# Patient Record
Sex: MALE | Race: BLACK OR AFRICAN AMERICAN | HISPANIC OR LATINO | URBAN - METROPOLITAN AREA
[De-identification: names, ages, dates, MRNs, and addresses within clinical notes are randomized per-mention and may not be internally consistent; named-entity substitution may affect disease eponyms.]

---

## 2023-08-18 ENCOUNTER — HOSPITAL ENCOUNTER (OUTPATIENT)
Dept: RADIOLOGY | Facility: HOSPITAL | Age: 28
Discharge: HOME | End: 2023-08-18
Attending: FAMILY MEDICINE
Payer: MEDICAID

## 2023-08-18 DIAGNOSIS — S83.242A OTHER TEAR OF MEDIAL MENISCUS, CURRENT INJURY, LEFT KNEE, INITIAL ENCOUNTER: ICD-10-CM

## 2023-08-18 PROCEDURE — 73721 MRI JNT OF LWR EXTRE W/O DYE: CPT | Mod: TC,LT

## 2023-08-18 PROCEDURE — 73721 MRI JNT OF LWR EXTRE W/O DYE: CPT | Mod: 26,LT,, | Performed by: RADIOLOGY

## 2023-08-18 PROCEDURE — 73721 MRI KNEE WITHOUT CONTRAST LEFT: ICD-10-PCS | Mod: 26,LT,, | Performed by: RADIOLOGY

## 2023-11-01 NOTE — PROGRESS NOTES
[facility]   Physical Therapy Initial Evaluation     Date: 11/2/2023   Name: Nino Sanchez [place]  Clinic Number: [id number]    Therapy Diagnosis:   Encounter Diagnoses   Name Primary?    Acute medial meniscus tear of left knee, initial encounter     Left knee pain, unspecified chronicity      Physician: Jean Marie Brizuela MD    Physician Orders: PT Eval and Treat   Medical Diagnosis from Referral: S83.242A (ICD-10-CM) - Acute medial meniscus tear of left knee, initial encounter  Evaluation Date: 11/2/2023  Authorization Period Expiration: 9/19/2024  Plan of Care Expiration: 1/20/2024  Progress Note Due: Visit 4  Visit #:  1/6   Visits authorized: 1/ 1   FOTO: 1/3    Precautions: Standard     Time In: 1100   Time Out: 1145  Total Appointment Time (timed & untimed codes): 45 minutes      SUBJECTIVE     Date of onset: 6 months ago    History of current condition - Nino reports: torn L mensicus. He thinks it may have happened while he was at work. He is trying to avoid having surgery. He feels that symptoms have improved over time, but still has problems with weightbearing flexion movements. Experiences popping/clicking at times when navigating stairs.    Falls: none    Imaging, MRI studies:   EXAMINATION:  MRI KNEE WITHOUT CONTRAST LEFT     CLINICAL HISTORY:  Other tear of medial meniscus, current injury, left knee, initial encounter     TECHNIQUE:  Multiplanar, multisequence images were performed about the left knee.  No contrast was administered.     COMPARISON:  None     FINDINGS:  There is a complex tear of the posterior horn and body segment of the medial meniscus, with primarily vertical longitudinal morphology.  The lateral meniscus is intact.     The ACL, PCL, MCL, and LCL structures are intact.  The extensor mechanism is intact.  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     There is no osseous fracture or contusion.   Articular cartilage is well maintained.     There is a moderate joint effusion.     Impression:     Complex tear of the medial meniscus.     Moderate joint effusion.    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    Surgical History:   Nino De Dios  has no past surgical history on file.    Medications:   Nino currently has no medications in their medication list.    Allergies:   Review of patient's allergies indicates:  No Known Allergies       OBJECTIVE       Range of Motion:   Knee Left active Left Passive Right Active R passive   Flexion 140  140    Extension 0  0            Lower Extremity Strength  Right LE  Left LE    Knee extension: 5/5 Knee extension: 4+/5   Knee flexion: 5/5 Knee flexion: 5/5   Hip flexion: 5/5 Hip flexion: 5/5   Hip extension:  5/5 Hip extension: 5/5   Hip abduction: 5/5 Hip abduction: 5/5   Hip adduction:  Hip adduction    Ankle dorsiflexion:  Ankle dorsiflexion:    Ankle plantarflexion:  Ankle plantarflexion:            Special Tests:   Left Right   Valgus Stress Test +    Varus Stress test -    Lachman's test     Posterior Lachman     Jorgito's Test +    Apley's Compression     Apley's Distraction     Mahajan's compression test     Thessaly's Test +    Patellar Grind Test + +     Step down test: Slight increase in genu valgus on L      Function:    - Squat: can squat through  full ROM, pain at end range and when returning to upright         Joint Mobility: Hypermobile  Patellar bilateral    Palpation: unremarkable    Sensation: (B) LEs intact to light touch           Limitation/Restriction for FOTO Intake Survey    Therapist reviewed FOTO scores for Nino De Dios on 2023.   FOTO documents entered into [employer] - see Media section.    Functional Score: 59%         TREATMENT     Total Treatment time (time-based codes) separate from Evaluation: 10 minutes      Nino received the treatments listed below:      therapeutic exercises to develop strength, endurance, ROM, posture, and core stabilization for 0 minutes including:      manual therapy techniques: Joint mobilizations, Myofacial release, and Soft tissue Mobilization were applied to the:  for 0 minutes, includin    neuromuscular re-education activities to improve: Balance, Coordination, Proprioception, and Posture for 0 minutes. The following activities were included:      therapeutic activities to improve functional performance for 10  minutes, including:  - Pt education on POC, HEP, open chain->closed chain strengthening to allow healing of meniscus      PATIENT EDUCATION AND HOME EXERCISES     Education provided:   - Objective exam, POC, HEP, do's and do not's for torn meniscus    Written Home Exercises Provided: yes. Exercises were reviewed and Nion was able to demonstrate them prior to the end of the session.  Nino demonstrated good  understanding of the education provided. See EMR under Patient Instructions for exercises provided during therapy sessions.    ASSESSMENT     Nino is a 28 y.o. male referred to outpatient Physical Therapy with a medical diagnosis of S83.242A (ICD-10-CM) - Acute medial meniscus tear of left knee, initial encounter. Patient presents with the following deficits: positive pain indications, and difficulty performing functional tasks. Deficits are minimal at this time. Pt is able to  perform all daily activities that he is required to at this time. However, some activities are still painful for him. Upon discussion of POC, pt requests to attempt self management with HEP only at this time. HEP was provided to improve L hip and knee strength & ROM. Pt was educated on progression of open chain -> closed chain knee exercises to avoid aggravation of meniscus tear. Advised pt that POC will still be made in case anything changes in the coming weeks and he feels he needs to come back to PT. No adverse effects    Patient prognosis is Excellent.   Patient will benefit from skilled outpatient Physical Therapy to address the deficits stated above and in the chart below, provide patient /family education, and to maximize patientt's level of independence.     Plan of care discussed with patient: Yes  Patient's spiritual, cultural and educational needs considered and patient is agreeable to the plan of care and goals as stated below:     Anticipated Barriers for therapy: none    Medical Necessity is demonstrated by the following  History  Co-morbidities and personal factors that may impact the plan of care Co-morbidities:   none    Personal Factors:   no deficits     low   Examination  Body Structures and Functions, activity limitations and participation restrictions that may impact the plan of care Body Regions:   lower extremities    Body Systems:    ROM  strength       low   Clinical Presentation stable and uncomplicated low   Decision Making/ Complexity Score: low     Goals:  Short Term Goals: 2 weeks   Independent with HEP to self manage symptoms and return to PLOF    Long Term Goals: 6 weeks   Able to squat through full ROM without symptom reproduction to perform job related tasks without difficulty  Able to navigate steps without pain to enter/exit his apartment  Improve FOTO score to 73% or better to indicate improved functional ability    PLAN   Plan of care Certification: 11/2/2023 to  1/20/2024.    Outpatient Physical Therapy 1 times weekly for 6 weeks to include the following interventions: Gait Training, Manual Therapy, Neuromuscular Re-ed, Patient Education, Therapeutic Activities, and Therapeutic Exercise.     Guido Reeves, PT      I CERTIFY THE NEED FOR THESE SERVICES FURNISHED UNDER THIS PLAN OF TREATMENT AND WHILE UNDER MY CARE   Physician's comments:     Physician's Signature: ___________________________________________________

## 2023-11-02 ENCOUNTER — CLINICAL SUPPORT (OUTPATIENT)
Dept: REHABILITATION | Facility: HOSPITAL | Age: 28
End: 2023-11-02
Attending: STUDENT IN AN ORGANIZED HEALTH CARE EDUCATION/TRAINING PROGRAM
Payer: MEDICAID

## 2023-11-02 DIAGNOSIS — S83.242A ACUTE MEDIAL MENISCUS TEAR OF LEFT KNEE, INITIAL ENCOUNTER: ICD-10-CM

## 2023-11-02 DIAGNOSIS — M25.562 LEFT KNEE PAIN, UNSPECIFIED CHRONICITY: ICD-10-CM

## 2023-11-02 PROCEDURE — 97530 THERAPEUTIC ACTIVITIES: CPT | Mod: PN

## 2023-11-02 PROCEDURE — 97161 PT EVAL LOW COMPLEX 20 MIN: CPT | Mod: PN

## 2023-11-02 NOTE — PATIENT INSTRUCTIONS
[employer] HOME EXERCISE PROGRAM  Created by Guido Reeves  Nov 2nd, 2023  View videos at [email or web address]   5 Exercises       Hamstring Stretch    Sitting up tall in chair, prop leg up on stool with leg straight and toes up toward ceiling. Hinge forward at waist keeping chest up tall until stretch is felt in back of leg (hamstring).   Repeat 3 Times  Hold 30 Seconds       STANDING CALF STRETCH - GASTROCNEMIUS    Start by standing in front of a wall or other sturdy object. Step forward with one foot and maintain your toes on both feet to be pointed straight forward. Keep the leg behind you with a straight knee during the stretch.    Lean forward towards the wall and support yourself with your arms as you allow your front knee to bend until a gentle stretch is felt along the back of your leg that is most behind you.    Move closer or further away from the wall to control the stretch of the back leg. Also you can adjust the bend of the front knee to control the stretch as well.    Video # [id number]   Repeat 3 Times  Hold 30 Seconds       Eccentric Long Arc Quad    In a seated position on a stable high surface, use the uninvolved leg to push the involved Knee up to an extended position. Slowly bring the leg down to flexed position.   Repeat 15 Times  Complete 3 Sets       STANDING HAMSTRING CURLS    While standing, bend your knee so that your heel moves towards your buttock. Lower back down until first contact with floor and repeat.  Keep knees in-line with one another.    Video # [id number]   Repeat 15 Times  Complete 3 Sets       LATERAL MONSTER WALK - ELASTIC BAND AT ANKLES SIDE STEPS    Place a looped elastic band around both ankles.    Next, bend your knees and step to the side while keeping tension on the band the entire time. After taking sidesteps to the side in one direction, reverse the direction taking sidesteps until you return to the starting position. Repeat.    Video # [id number]   Hold 1  Minute  Complete 3 Sets

## 2023-11-02 NOTE — PLAN OF CARE
[facility]   Physical Therapy Initial Evaluation     Date: 11/2/2023   Name: Nino Sanchez [place]  Clinic Number: [id number]    Therapy Diagnosis:   Encounter Diagnoses   Name Primary?    Acute medial meniscus tear of left knee, initial encounter     Left knee pain, unspecified chronicity      Physician: Jean Marie Brizuela MD    Physician Orders: PT Eval and Treat   Medical Diagnosis from Referral: S83.242A (ICD-10-CM) - Acute medial meniscus tear of left knee, initial encounter  Evaluation Date: 11/2/2023  Authorization Period Expiration: 9/19/2024  Plan of Care Expiration: 1/20/2024  Progress Note Due: Visit 4  Visit #:  1/6   Visits authorized: 1/ 1   FOTO: 1/3    Precautions: Standard     Time In: 1100   Time Out: 1145  Total Appointment Time (timed & untimed codes): 45 minutes      SUBJECTIVE     Date of onset: 6 months ago    History of current condition - Nino reports: torn L mensicus. He thinks it may have happened while he was at work. He is trying to avoid having surgery. He feels that symptoms have improved over time, but still has problems with weightbearing flexion movements. Experiences popping/clicking at times when navigating stairs.    Falls: none    Imaging, MRI studies:   EXAMINATION:  MRI KNEE WITHOUT CONTRAST LEFT     CLINICAL HISTORY:  Other tear of medial meniscus, current injury, left knee, initial encounter     TECHNIQUE:  Multiplanar, multisequence images were performed about the left knee.  No contrast was administered.     COMPARISON:  None     FINDINGS:  There is a complex tear of the posterior horn and body segment of the medial meniscus, with primarily vertical longitudinal morphology.  The lateral meniscus is intact.     The ACL, PCL, MCL, and LCL structures are intact.  The extensor mechanism is intact.  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     There is no osseous fracture or contusion.   Articular cartilage is well maintained.     There is a moderate joint effusion.     Impression:     Complex tear of the medial meniscus.     Moderate joint effusion.    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    Surgical History:   Nino De Dios  has no past surgical history on file.    Medications:   Nino currently has no medications in their medication list.    Allergies:   Review of patient's allergies indicates:  No Known Allergies       OBJECTIVE       Range of Motion:   Knee Left active Left Passive Right Active R passive   Flexion 140  140    Extension 0  0            Lower Extremity Strength  Right LE  Left LE    Knee extension: 5/5 Knee extension: 4+/5   Knee flexion: 5/5 Knee flexion: 5/5   Hip flexion: 5/5 Hip flexion: 5/5   Hip extension:  5/5 Hip extension: 5/5   Hip abduction: 5/5 Hip abduction: 5/5   Hip adduction:  Hip adduction    Ankle dorsiflexion:  Ankle dorsiflexion:    Ankle plantarflexion:  Ankle plantarflexion:            Special Tests:   Left Right   Valgus Stress Test +    Varus Stress test -    Lachman's test     Posterior Lachman     Jorgito's Test +    Apley's Compression     Apley's Distraction     Mahajan's compression test     Thessaly's Test +    Patellar Grind Test + +     Step down test: Slight increase in genu valgus on L      Function:    - Squat: can squat through  full ROM, pain at end range and when returning to upright         Joint Mobility: Hypermobile  Patellar bilateral    Palpation: unremarkable    Sensation: (B) LEs intact to light touch           Limitation/Restriction for FOTO Intake Survey    Therapist reviewed FOTO scores for Nino De Dios on 2023.   FOTO documents entered into [employer] - see Media section.    Functional Score: 59%         TREATMENT     Total Treatment time (time-based codes) separate from Evaluation: 10 minutes      Nino received the treatments listed below:      therapeutic exercises to develop strength, endurance, ROM, posture, and core stabilization for 0 minutes including:      manual therapy techniques: Joint mobilizations, Myofacial release, and Soft tissue Mobilization were applied to the:  for 0 minutes, includin    neuromuscular re-education activities to improve: Balance, Coordination, Proprioception, and Posture for 0 minutes. The following activities were included:      therapeutic activities to improve functional performance for 10  minutes, including:  - Pt education on POC, HEP, open chain->closed chain strengthening to allow healing of meniscus      PATIENT EDUCATION AND HOME EXERCISES     Education provided:   - Objective exam, POC, HEP, do's and do not's for torn meniscus    Written Home Exercises Provided: yes. Exercises were reviewed and Nino was able to demonstrate them prior to the end of the session.  Nino demonstrated good  understanding of the education provided. See EMR under Patient Instructions for exercises provided during therapy sessions.    ASSESSMENT     Nino is a 28 y.o. male referred to outpatient Physical Therapy with a medical diagnosis of S83.242A (ICD-10-CM) - Acute medial meniscus tear of left knee, initial encounter. Patient presents with the following deficits: positive pain indications, and difficulty performing functional tasks. Deficits are minimal at this time. Pt is able to  perform all daily activities that he is required to at this time. However, some activities are still painful for him. Upon discussion of POC, pt requests to attempt self management with HEP only at this time. HEP was provided to improve L hip and knee strength & ROM. Pt was educated on progression of open chain -> closed chain knee exercises to avoid aggravation of meniscus tear. Advised pt that POC will still be made in case anything changes in the coming weeks and he feels he needs to come back to PT. No adverse effects    Patient prognosis is Excellent.   Patient will benefit from skilled outpatient Physical Therapy to address the deficits stated above and in the chart below, provide patient /family education, and to maximize patientt's level of independence.     Plan of care discussed with patient: Yes  Patient's spiritual, cultural and educational needs considered and patient is agreeable to the plan of care and goals as stated below:     Anticipated Barriers for therapy: none    Medical Necessity is demonstrated by the following  History  Co-morbidities and personal factors that may impact the plan of care Co-morbidities:   none    Personal Factors:   no deficits     low   Examination  Body Structures and Functions, activity limitations and participation restrictions that may impact the plan of care Body Regions:   lower extremities    Body Systems:    ROM  strength       low   Clinical Presentation stable and uncomplicated low   Decision Making/ Complexity Score: low     Goals:  Short Term Goals: 2 weeks   Independent with HEP to self manage symptoms and return to PLOF    Long Term Goals: 6 weeks   Able to squat through full ROM without symptom reproduction to perform job related tasks without difficulty  Able to navigate steps without pain to enter/exit his apartment  Improve FOTO score to 73% or better to indicate improved functional ability    PLAN   Plan of care Certification: 11/2/2023 to  1/20/2024.    Outpatient Physical Therapy 1 times weekly for 6 weeks to include the following interventions: Gait Training, Manual Therapy, Neuromuscular Re-ed, Patient Education, Therapeutic Activities, and Therapeutic Exercise.     Guido Reeves, PT      I CERTIFY THE NEED FOR THESE SERVICES FURNISHED UNDER THIS PLAN OF TREATMENT AND WHILE UNDER MY CARE   Physician's comments:     Physician's Signature: ___________________________________________________